# Patient Record
Sex: FEMALE | Race: WHITE | NOT HISPANIC OR LATINO
[De-identification: names, ages, dates, MRNs, and addresses within clinical notes are randomized per-mention and may not be internally consistent; named-entity substitution may affect disease eponyms.]

---

## 2020-11-17 PROBLEM — Z00.00 ENCOUNTER FOR PREVENTIVE HEALTH EXAMINATION: Status: ACTIVE | Noted: 2020-11-17

## 2020-11-20 ENCOUNTER — APPOINTMENT (OUTPATIENT)
Dept: HEART AND VASCULAR | Facility: CLINIC | Age: 20
End: 2020-11-20
Payer: COMMERCIAL

## 2020-11-20 DIAGNOSIS — R00.2 PALPITATIONS: ICD-10-CM

## 2020-11-20 PROCEDURE — 93306 TTE W/DOPPLER COMPLETE: CPT

## 2020-12-01 ENCOUNTER — EMERGENCY (EMERGENCY)
Facility: HOSPITAL | Age: 20
LOS: 1 days | Discharge: HOME | End: 2020-12-01
Admitting: EMERGENCY MEDICINE
Payer: COMMERCIAL

## 2020-12-01 VITALS
OXYGEN SATURATION: 99 % | DIASTOLIC BLOOD PRESSURE: 74 MMHG | RESPIRATION RATE: 16 BRPM | TEMPERATURE: 98 F | HEART RATE: 77 BPM | SYSTOLIC BLOOD PRESSURE: 122 MMHG

## 2020-12-01 PROCEDURE — 99283 EMERGENCY DEPT VISIT LOW MDM: CPT

## 2020-12-01 NOTE — ED PROVIDER NOTE - CLINICAL SUMMARY MEDICAL DECISION MAKING FREE TEXT BOX
Asymptomatic patient here for COVID screening. Risk of exposure is very low. Reviewed vitals and testing plan with the patient. Encouraged to download the follow my health idalia for test results.

## 2020-12-01 NOTE — ED PROVIDER NOTE - PATIENT PORTAL LINK FT
You can access the FollowMyHealth Patient Portal offered by [facility] by registering at the following website: [email or web address]. By joining [employer]’s FollowMyHealth portal, you will also be able to view your health information using other applications (apps) compatible with our system.

## 2020-12-01 NOTE — ED PROVIDER NOTE - OBJECTIVE STATEMENT
presenting to the ED requesting COVID-19 screening. Patient is currently asymptomatic and has no other medical complaints at this time. Denies fever, chills, chest pain, SOB.

## 2020-12-02 LAB — SARS-COV-2 RNA SPEC QL NAA+PROBE: SIGNIFICANT CHANGE UP

## 2020-12-04 DIAGNOSIS — Z20.828 CONTACT WITH AND (SUSPECTED) EXPOSURE TO OTHER VIRAL COMMUNICABLE DISEASES: ICD-10-CM

## 2020-12-12 ENCOUNTER — EMERGENCY (EMERGENCY)
Facility: HOSPITAL | Age: 20
LOS: 1 days | Discharge: HOME | End: 2020-12-12
Admitting: EMERGENCY MEDICINE
Payer: COMMERCIAL

## 2020-12-12 VITALS
RESPIRATION RATE: 16 BRPM | TEMPERATURE: 99 F | HEART RATE: 91 BPM | OXYGEN SATURATION: 99 % | DIASTOLIC BLOOD PRESSURE: 76 MMHG | SYSTOLIC BLOOD PRESSURE: 109 MMHG

## 2020-12-12 DIAGNOSIS — Z20.828 CONTACT WITH AND (SUSPECTED) EXPOSURE TO OTHER VIRAL COMMUNICABLE DISEASES: ICD-10-CM

## 2020-12-12 PROCEDURE — 99283 EMERGENCY DEPT VISIT LOW MDM: CPT

## 2020-12-12 NOTE — ED PROVIDER NOTE - PATIENT PORTAL LINK FT
You can access the FollowMyHealth Patient Portal offered by [facility] by registering at the following website: [email or web address]. By joining [employer]’s FollowMyHealth portal, you will also be able to view your health information using other applications (apps) compatible with our system.

## 2020-12-12 NOTE — ED PROVIDER NOTE - OBJECTIVE STATEMENT
Patient presents for COVID screening. denies CP, cough, fever chills, bodyaches, nausea, vomiting, SOB, recent sick contacts, recent travel.

## 2020-12-13 LAB — SARS-COV-2 RNA SPEC QL NAA+PROBE: SIGNIFICANT CHANGE UP

## 2021-01-11 ENCOUNTER — EMERGENCY (EMERGENCY)
Facility: HOSPITAL | Age: 21
LOS: 1 days | Discharge: HOME | End: 2021-01-11
Admitting: EMERGENCY MEDICINE
Payer: COMMERCIAL

## 2021-01-11 VITALS
DIASTOLIC BLOOD PRESSURE: 78 MMHG | SYSTOLIC BLOOD PRESSURE: 138 MMHG | RESPIRATION RATE: 8 BRPM | HEART RATE: 81 BPM | OXYGEN SATURATION: 98 % | TEMPERATURE: 98 F

## 2021-01-11 DIAGNOSIS — Z20.822 CONTACT WITH AND (SUSPECTED) EXPOSURE TO COVID-19: ICD-10-CM

## 2021-01-11 PROBLEM — Z78.9 OTHER SPECIFIED HEALTH STATUS: Chronic | Status: ACTIVE | Noted: 2020-12-12

## 2021-01-11 PROCEDURE — 99283 EMERGENCY DEPT VISIT LOW MDM: CPT

## 2021-01-11 NOTE — ED PROVIDER NOTE - PATIENT PORTAL LINK FT
You can access the FollowMyHealth Patient Portal offered by [facility] by registering at the following website: [email or web address]. By joining [employer]’s FollowMyHealth portal, you will also be able to view your health information using other applications (apps) compatible with our system.

## 2021-01-11 NOTE — ED PROVIDER NOTE - OBJECTIVE STATEMENT
19 y/o female presents to the ED requesting COVID-19 testing. Patient is currently asymptomatic and has no other medical complaints at this time. Denies fever, chills, chest pain, SOB, cough.

## 2022-10-13 ENCOUNTER — EMERGENCY (EMERGENCY)
Facility: HOSPITAL | Age: 22
LOS: 1 days | Discharge: HOME | End: 2022-10-13
Attending: EMERGENCY MEDICINE | Admitting: EMERGENCY MEDICINE

## 2022-10-13 VITALS
DIASTOLIC BLOOD PRESSURE: 83 MMHG | HEART RATE: 73 BPM | OXYGEN SATURATION: 100 % | HEIGHT: 65 IN | SYSTOLIC BLOOD PRESSURE: 129 MMHG | WEIGHT: 179.9 LBS | TEMPERATURE: 97 F | RESPIRATION RATE: 18 BRPM

## 2022-10-13 VITALS
HEART RATE: 80 BPM | OXYGEN SATURATION: 100 % | TEMPERATURE: 98 F | SYSTOLIC BLOOD PRESSURE: 130 MMHG | RESPIRATION RATE: 18 BRPM | DIASTOLIC BLOOD PRESSURE: 78 MMHG

## 2022-10-13 DIAGNOSIS — M79.671 PAIN IN RIGHT FOOT: ICD-10-CM

## 2022-10-13 PROCEDURE — 99283 EMERGENCY DEPT VISIT LOW MDM: CPT | Mod: 25

## 2022-10-13 PROCEDURE — 73630 X-RAY EXAM OF FOOT: CPT | Mod: 26

## 2022-10-13 PROCEDURE — 73630 X-RAY EXAM OF FOOT: CPT | Mod: 26,RT

## 2022-10-13 NOTE — ED PROVIDER NOTE - NSFOLLOWUPINSTRUCTIONS_ED_ALL_ED_FT
Foot Sprain       A foot sprain is an injury to one of the ligaments in the feet. Ligaments are strong tissues that connect bones to each other. The ligament can be stretched too much. In some cases, it may tear. A tear can be either partial or complete. The severity of the sprain depends on how much of the ligament was damaged or torn.      What are the causes?    This condition is usually caused by suddenly twisting or pivoting your foot.      What increases the risk?    You are more likely to develop this condition if:  •You play a sport, such as basketball or football.      •You exercise or play a sport without first warming up your muscles.      •You start a new workout or sport.      •You suddenly increase how long or hard you exercise or play a sport.      •You have injured your foot or ankle before.        What are the signs or symptoms?    Symptoms of this condition start soon after an injury and include:  •Pain, especially in the arch of your foot.      •Bruising.      •Swelling.      •Being unable to walk or use your foot to support body weight.        How is this diagnosed?    This condition is diagnosed with a medical history and physical exam. You may also have imaging tests, such as:  •X-rays to check for broken bones (fractures).      •An MRI to see if the ligament is torn.        How is this treated?    Treatment for this condition depends on the severity of the sprain. Mild sprains and major sprains can be treated with:  •Rest, ice, pressure (compression), and elevation (RICE). Elevation means raising your injured foot.      •Keeping your foot in a fixed position (immobilization) for a period of time. This is done if your ligament is overstretched or partially torn. Your health care provider will apply a bandage, splint, or walking boot to keep your foot from moving until it heals.      •Using crutches or a scooter for a few weeks to avoid bearing weight on your foot while it is healing.      •Physical therapy exercises to improve movement and strength in your foot.      Major sprains may also be treated with:  •Surgery. This is done if your ligament is fully torn and a procedure is needed to reconnect it to the bone.      •A cast or splint. This will be needed after surgery. A cast or splint will need to stay on your foot while it heals.        Follow these instructions at home:    If you have a bandage, splint, or boot:     •Wear it as told by your health care provider. Remove it only as told by your health care provider.      •Loosen it if your toes tingle, become numb, or turn cold and blue.      •Keep it clean and dry.      If you have a cast:     • Do not put pressure on any part of the cast until it is fully hardened. This may take several hours.      • Do not stick anything inside the cast to scratch your skin. Doing that increases your risk for infection.      •Check the skin around the cast every day. Tell your health care provider about any concerns.      •You may put lotion on dry skin around the edges of the cast. Do not put lotion on the skin underneath the cast.      •Keep it clean and dry.      Bathing     • Do not take baths, swim, or use a hot tub until your health care provider approves. Ask your health care provider if you may take showers. You may only be allowed to take sponge baths.    •If the bandage, splint, boot, or cast is not waterproof:  •Do not let it get wet.      •Cover it with a watertight covering when you take a bath or shower.          Managing pain, stiffness, and swelling    •If directed, put ice on the injured area. To do this:  •If you have a removable bandage, splint, or boot, remove it as told by your health care provider.      •Put ice in a plastic bag.      •Place a towel between your skin and the bag, or between your cast and the bag.      •Leave the ice on for 20 minutes, 2–3 times per day.      •Remove the ice if your skin turns bright red. This is very important. If you cannot feel pain, heat, or cold, you have a greater risk of damage to the area.        •Move your toes often to reduce stiffness and swelling.      •Elevate the injured area above the level of your heart while you are sitting or lying down.      Activity     • Do not use the injured foot to support your body weight until your health care provider says that you can. Use crutches or a scooter as told by your health care provider.      •Ask your health care provider what activities are safe for you. Do exercises as told by your health care provider.      •Gradually increase how much and how far you walk until your health care provider says it is safe to return to full activity.      Driving     •Ask your health care provider if the medicine prescribed to you requires you to avoid driving or using machinery.      •Ask your health care provider when it is safe to drive if you have a bandage, splint, boot, or cast on your foot.      General instructions     •Take over-the-counter and prescription medicines only as told by your health care provider.      •When you can walk without pain, wear supportive shoes that have stiff soles. Do not wear flip-flops. Do not walk barefoot.      •Keep all follow-up visits. This is important.        Contact a health care provider if:    •Medicine does not help your pain.      •Your bruising or swelling gets worse or does not get better with treatment.      •Your splint, boot, or cast is damaged.        Get help right away if:    •You develop severe numbness or tingling in your foot.      •Your foot turns blue, white, or gray, and it feels cold.        Summary    •A foot sprain is an injury to one of the ligaments in the feet. Ligaments are strong tissues that connect bones to each other.      •You may need a bandage, splint, boot, or cast to support your foot while it heals. Sometimes, surgery may be needed.      •You may need physical therapy exercises to improve movement and strength in your foot.      This information is not intended to replace advice given to you by your health care provider. Make sure you discuss any questions you have with your health care provider.

## 2022-10-13 NOTE — ED PROVIDER NOTE - CLINICAL SUMMARY MEDICAL DECISION MAKING FREE TEXT BOX
Atraumatic R foot pain.  Mild ttp R lateral dorsal 4-5 MT.  Nl pulses.  Ambulatory.  Xray obtained and neg.  Unlikely occult fx.  Plan dc and outpt fu.  ? strain.

## 2022-10-13 NOTE — ED PROVIDER NOTE - OBJECTIVE STATEMENT
23 yo F with atraumatic R lateral dorsal foot pain x 1 week most notable when ambulating and unrelated to any  known trauma or injury, although patient states she has been play more pickleball lately.  No weakness, numbness, tingling, fever, chills, redness or increased heat to skin.  No hx of gout.

## 2022-10-13 NOTE — ED ADULT NURSE NOTE - BRAND OF COVID-19 VACCINATION
Problem: Discharge Planning:  Goal: Discharged to appropriate level of care  Description  Discharged to appropriate level of care  1/29/2020 0337 by Annia Leone RN  Outcome: Ongoing  Note:   Discharge not anticipated. Will continue to evaluate for needs. Problem: Constipation:  Goal: Bowel elimination is within specified parameters  Description  Bowel elimination is within specified parameters  1/29/2020 0337 by Annia Leone RN  Outcome: Ongoing  Note:   Pt offered colace at [facility] PR[facility]       Problem: Fluid Volume - Imbalance:  Goal: Absence of postpartum hemorrhage signs and symptoms  Description  Absence of postpartum hemorrhage signs and symptoms  1/29/2020 0337 by Annia Leone RN  Outcome: Ongoing  Note:   Vaginal bleeding WNL, no clots or foul odors. Problem: Infection - Risk of, Puerperal Infection:  Goal: Will show no infection signs and symptoms  Description  Will show no infection signs and symptoms  1/29/2020 0337 by Annia Leone RN  Outcome: Ongoing  Note:   Vital signs and assessments WNL. Problem: Mood - Altered:  Goal: Mood stable  Description  Mood stable  1/29/2020 0337 by Annia Leone RN  Outcome: Ongoing  Note:   Pt is calm and cooperative. Problem: Pain - Acute:  Goal: Pain level will decrease  Description  Pain level will decrease  1/29/2020 0337 by Annia Leone RN  Outcome: Ongoing  Note:   Pain controlled with po meds. Discussed ice for perineal pain and/or incisional pain or the use of warm blanket/heating pad for uterine cramps. Pt states her pain goal 3/10 has been met. Problem: Falls - Risk of:  Goal: Will remain free from falls  Description  Will remain free from falls  1/29/2020 0033 by Annia Leone RN  Outcome: Ongoing  Note:   Pt is free from falls for this shift. Pt has steady gait.        Problem: Pain:  Goal: Pain level will decrease  Description  Pain level will decrease  1/29/2020 0337 by Jacquie Reynolds Pfizer dose 1, 2, and 3

## 2022-10-13 NOTE — ED ADULT NURSE NOTE - OBJECTIVE STATEMENT
Pt AAOx4, c/o right lateral foot pain when ambulating, starting at pinky toe and radiating up top of foot. Denies any trauma, no obvious deformity, swelling or redness noted.

## 2022-10-13 NOTE — ED ADULT TRIAGE NOTE - CHIEF COMPLAINT QUOTE
walk in pt with complaints of right foot pain x 5 days. Denies injury, swelling, redness or other abnormalities.

## 2022-10-13 NOTE — ED PROVIDER NOTE - MUSCULOSKELETAL, MLM
Spine appears normal, range of motion is not limited, mild ttp dorsal of R 4-5th MT.  No edema or skin changes.  Nl pedal pulses.

## 2022-10-13 NOTE — ED PROVIDER NOTE - PATIENT PORTAL LINK FT
You can access the FollowMyHealth Patient Portal offered by [facility] by registering at the following website: [email or web address]. By joining [employer]’s FollowMyHealth portal, you will also be able to view your health information using other applications (apps) compatible with our system.

## 2023-01-05 NOTE — ED PROVIDER NOTE - TEMPLATE, MLM
General Acitretin Counseling:  I discussed with the patient the risks of acitretin including but not limited to hair loss, dry lips/skin/eyes, liver damage, hyperlipidemia, depression/suicidal ideation, photosensitivity.  Serious rare side effects can include but are not limited to pancreatitis, pseudotumor cerebri, bony changes, clot formation/stroke/heart attack.  Patient understands that alcohol is contraindicated since it can result in liver toxicity and significantly prolong the elimination of the drug by many years.

## 2024-01-14 NOTE — ED PROVIDER NOTE - NSFOLLOWUPINSTRUCTIONS_ED_ALL_ED_FT
THE COVID 19 TEST RESULTS  - results may take up to 2-3 days to become available   - if you have consented, you will receive your results electronically   -  you can check [facility] [facility] or call [phone] to discuss your results with our nursing staff    Please continue to self quarantine (home isolation) until your result is back and follow instructions accordingly  - positive: complete home isolation for a total of 14 days since day of testing and no more fever with feeling back to baseline   - negative: you will be able to stop home isolation but still practice standard precautions, similar to how you would manage a regular flu/cold.    Return to ER for any worsening symptoms, such as persistent fever >100.4F, shortness of breath, coughing up bloody sputum, chest pain, lethargy, and fainting    Please remember to wash your hands frequently (>20 seconds each time), avoid touching your face, and cover your cough/sneeze.  Always wear a mask when you are outside of your home and practice social distancing.    Only take Tylenol for fever/pain control and avoid NSAIDs (ibuprofen/Advil/Aleve/naproxen) due to potential increased risk of exacerbating COVID-19 infection 82